# Patient Record
Sex: FEMALE | URBAN - METROPOLITAN AREA
[De-identification: names, ages, dates, MRNs, and addresses within clinical notes are randomized per-mention and may not be internally consistent; named-entity substitution may affect disease eponyms.]

---

## 2022-09-27 ENCOUNTER — OFFICE VISIT (OUTPATIENT)
Dept: URBAN - METROPOLITAN AREA CLINIC 35 | Facility: CLINIC | Age: 32
End: 2022-09-27

## 2022-09-27 NOTE — HPI-TODAY'S VISIT:
Patient admits abdominal pain that is located ____ and is described as a _____. Pain started ____ and the patient states that the occurrence _____. Some days pain is constant, other days she does not experience the pain.  Patient admits/ denies any aggravating factors:____. Patient admits/ denies any alleviating factors: ____. Patient has tried _____ with improvement relief of their symptoms. Patient admits the use of antibiotics within the last (3-6) months.